# Patient Record
Sex: FEMALE | Race: WHITE | NOT HISPANIC OR LATINO | Employment: UNEMPLOYED | URBAN - METROPOLITAN AREA
[De-identification: names, ages, dates, MRNs, and addresses within clinical notes are randomized per-mention and may not be internally consistent; named-entity substitution may affect disease eponyms.]

---

## 2019-10-17 ENCOUNTER — APPOINTMENT (OUTPATIENT)
Dept: GENERAL RADIOLOGY | Facility: HOSPITAL | Age: 44
End: 2019-10-17

## 2019-10-17 ENCOUNTER — HOSPITAL ENCOUNTER (EMERGENCY)
Facility: HOSPITAL | Age: 44
Discharge: HOME | End: 2019-10-17
Attending: EMERGENCY MEDICINE | Admitting: EMERGENCY MEDICINE

## 2019-10-17 VITALS
WEIGHT: 185 LBS | HEIGHT: 69 IN | HEART RATE: 76 BPM | SYSTOLIC BLOOD PRESSURE: 136 MMHG | RESPIRATION RATE: 18 BRPM | TEMPERATURE: 98.2 F | DIASTOLIC BLOOD PRESSURE: 71 MMHG | BODY MASS INDEX: 27.4 KG/M2 | OXYGEN SATURATION: 98 %

## 2019-10-17 DIAGNOSIS — S43.402A SPRAIN OF LEFT SHOULDER, UNSPECIFIED SHOULDER SPRAIN TYPE, INITIAL ENCOUNTER: ICD-10-CM

## 2019-10-17 DIAGNOSIS — S73.102A HIP SPRAIN, LEFT, INITIAL ENCOUNTER: ICD-10-CM

## 2019-10-17 DIAGNOSIS — S53.402A ELBOW SPRAIN, LEFT, INITIAL ENCOUNTER: ICD-10-CM

## 2019-10-17 DIAGNOSIS — S39.012A BACK STRAIN, INITIAL ENCOUNTER: ICD-10-CM

## 2019-10-17 DIAGNOSIS — S63.502A SPRAIN OF LEFT WRIST, INITIAL ENCOUNTER: ICD-10-CM
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
## 2019-10-17 LAB
B-HCG UR QL: NEGATIVE
INTERNAL NEGATIVE CONTROL: NEGATIVE
INTERNAL POSITIVE CONTROL: POSITIVE
Lab: NORMAL

## 2019-10-17 PROCEDURE — 71101 X-RAY EXAM UNILAT RIBS/CHEST: CPT

## 2019-10-17 PROCEDURE — 72040 X-RAY EXAM NECK SPINE 2-3 VW: CPT

## 2019-10-17 PROCEDURE — 81025 URINE PREGNANCY TEST: CPT | Performed by: PHYSICIAN ASSISTANT

## 2019-10-17 PROCEDURE — 73030 X-RAY EXAM OF SHOULDER: CPT

## 2019-10-17 PROCEDURE — 73070 X-RAY EXAM OF ELBOW: CPT

## 2019-10-17 PROCEDURE — 72100 X-RAY EXAM L-S SPINE 2/3 VWS: CPT

## 2019-10-17 PROCEDURE — 73110 X-RAY EXAM OF WRIST: CPT

## 2019-10-17 PROCEDURE — 73502 X-RAY EXAM HIP UNI 2-3 VIEWS: CPT

## 2019-10-17 PROCEDURE — 99283 EMERGENCY DEPT VISIT LOW MDM: CPT

## 2019-10-17 RX ORDER — CYCLOBENZAPRINE HCL 10 MG
10 TABLET ORAL 3 TIMES DAILY PRN
Qty: 15 TABLET | Refills: 0 | Status: SHIPPED | OUTPATIENT
Start: 2019-10-17

## 2019-10-17 RX ORDER — IBUPROFEN 400 MG/1
800 TABLET ORAL ONCE
Status: COMPLETED | OUTPATIENT
Start: 2019-10-17 | End: 2019-10-17

## 2019-10-17 RX ADMIN — IBUPROFEN 800 MG: 400 TABLET, FILM COATED ORAL at 11:45

## 2019-10-17 NOTE — ED PROVIDER NOTES
Subjective   Georgie David is a 43 y.o. female who presents to the ED with c/o fall. The patient reports ambulating in her home 5 days ago when she fell and states she hit so hard so she bounced up and hit the hardwood floor again. She did not present to an ED after the fall as she thought the pain would subside but over the past 5 days the shooting pain has worsened, specifically in her left rib, left hip, and left leg, prompting her to present to the ED. The patient complains of mild left sided hand and foot pain, pain upon deep breathing, and ambulating with a limp due to her pain. She denies any previous injuries to her shoulders or ribs. The patient is not currently taking anticoagulants but reports taking insulin for diabetes mellitus. She denies a possible pregnancy. There are no other acute complaints at this time.        History provided by:  Patient  Fall   Mechanism of injury: fall    Injury location:  Pelvis and torso  Torso injury location:  Back (left hip)  Pelvic injury location:  L hip  Incident location:  Home  Time since incident:  5 days  Arrived directly from scene: no    Fall:     Fall occurred:  Standing    Impact surface:  Hard floor    Point of impact:  Buttocks and back    Entrapped after fall: no    Protective equipment: none    Prior to arrival data:     Bystander interventions:  None    Patient ambulatory at scene: yes      Blood loss:  None    Responsiveness at scene:  Alert    Orientation at scene:  Person, place, situation and time    Loss of consciousness: no      Amnesic to event: no      Airway condition since incident:  Stable    Breathing condition since incident:  Stable    Circulation condition since incident:  Stable    Mental status condition since incident:  Stable  Associated symptoms: no abdominal pain, no back pain, no chest pain, no loss of consciousness, no nausea, no neck pain and no vomiting    Risk factors: diabetes    Risk factors: no anticoagulation therapy         Review of Systems   Cardiovascular: Negative for chest pain.   Gastrointestinal: Negative for abdominal pain, nausea and vomiting.   Musculoskeletal: Positive for gait problem (patient walks with a limp due to the pain). Negative for back pain and neck pain.        Patient complains of left rib, left hip, and left leg pain.  There is mild pain in her left foot and hand.  The pain is shooting and exacerbated by deep breathing.   Neurological: Negative for loss of consciousness.   All other systems reviewed and are negative.      No past medical history on file.    No Known Allergies    No past surgical history on file.    No family history on file.    Social History     Socioeconomic History   • Marital status:      Spouse name: Not on file   • Number of children: Not on file   • Years of education: Not on file   • Highest education level: Not on file         Objective   Physical Exam   Constitutional: She is oriented to person, place, and time. Vital signs are normal. She appears well-developed.   HENT:   Head: Atraumatic.   Nose: Nose normal.   Mouth/Throat: Mucous membranes are normal.   Eyes: Conjunctivae, EOM and lids are normal. Pupils are equal, round, and reactive to light.   Neck: Normal range of motion. Neck supple.   Cardiovascular: Normal rate, regular rhythm and normal heart sounds.   Pulmonary/Chest: Effort normal and breath sounds normal. She has no wheezes. She exhibits tenderness (left inferior chest wall ). She exhibits no crepitus, no deformity and no swelling.   Abdominal: Soft. She exhibits no distension. There is no tenderness. There is no rebound and no guarding.   Musculoskeletal: She exhibits no edema.        Left shoulder: She exhibits tenderness. She exhibits normal range of motion, no swelling, no deformity, normal pulse and normal strength.        Left elbow: She exhibits normal range of motion, no swelling and no deformity. Tenderness found.        Left wrist: She exhibits  tenderness. She exhibits normal range of motion, no swelling and no deformity.        Left hip: She exhibits tenderness. She exhibits normal range of motion, no swelling and no deformity.        Cervical back: She exhibits tenderness. She exhibits normal range of motion, no swelling and no deformity.        Thoracic back: She exhibits normal range of motion and no tenderness.        Lumbar back: She exhibits tenderness. She exhibits normal range of motion, no swelling and no deformity.   Neurological: She is alert and oriented to person, place, and time. No sensory deficit.   Skin: Skin is warm and dry. No rash noted. No erythema.   Psychiatric: She has a normal mood and affect. Her speech is normal and behavior is normal.   Nursing note and vitals reviewed.      Procedures         ED Course      Discussed results with patient and tx plan for discharge. No acute fractures on xrays. List of PCP's provided for her to establish care.     Recent Results (from the past 24 hour(s))   POCT Pregnancy, Urine    Collection Time: 10/17/19 11:49 AM   Result Value Ref Range    HCG, Urine, QL Negative Negative    Lot Number [id number]     Internal Positive Control Positive     Internal Negative Control Negative      Note: In addition to lab results from this visit, the labs listed above may include labs taken at another facility or during a different encounter within the last 24 hours. Please correlate lab times with ED admission and discharge times for further clarification of the services performed during this visit.    XR Wrist 3+ View Left   Preliminary Result   No acute bony abnormality.       D:  10/17/2019   E:  10/17/2019                  XR Ribs Left With PA Chest   Preliminary Result   No acute cardiopulmonary disease, no left-sided rib fracture   present.       D:  10/17/2019   E:  10/17/2019                  XR Spine Cervical 2 View   Preliminary Result   No acute fracture or dislocation with slight straightening   of  "the normal lordosis of the cervical spine.       D:  10/17/2019   E:  10/17/2019              XR Shoulder 2+ View Left   Preliminary Result   No acute bony abnormality.       D:  10/17/2019   E:  10/17/2019                  XR Spine Lumbar 2 or 3 View   Preliminary Result   No acute fracture or malalignment.           D:  10/17/2019   E:  10/17/2019          XR Elbow 2 View Left   Preliminary Result   No acute bony abnormality.       D:  10/17/2019   E:  10/17/2019              XR Hip With or Without Pelvis 2 - 3 View Left   Preliminary Result   Degenerative changes seen within the left sacroiliac joint   and left pubic symphysis with no fracture or dislocation seen within the   left hip.       D:  10/17/2019   E:  10/17/2019            Vitals:    10/17/19 1047 10/17/19 1358   BP: 139/73 136/71   BP Location: Left arm Left arm   Patient Position: Sitting Sitting   Pulse: 72 76   Resp: 16 18   Temp: 97.3 °F (36.3 °C) 98.2 °F (36.8 °C)   TempSrc: Oral Oral   SpO2: 98% 98%   Weight: 83.9 kg (185 lb)    Height: 175.3 cm (69\")      Medications   ibuprofen (ADVIL,MOTRIN) tablet 800 mg (800 mg Oral Given 10/17/19 1145)     ECG/EMG Results (last 24 hours)     ** No results found for the last 24 hours. **        No orders to display                       MDM    Final diagnoses:   Fall, initial encounter   Back strain, initial encounter   Sprain of left shoulder, unspecified shoulder sprain type, initial encounter   Sprain of left wrist, initial encounter   Elbow sprain, left, initial encounter   Hip sprain, left, initial encounter       Documentation assistance provided by lizbeth OSWALD Cory.  Information recorded by the scribngoc was done at my direction and has been verified and validated by me.     James Ray  10/17/19 9265       Luanne Payton PA  10/17/19 6365    "

## 2025-01-30 ENCOUNTER — OFFICE VISIT (OUTPATIENT)
Age: 50
End: 2025-01-30
Payer: MEDICAID

## 2025-01-30 VITALS
HEIGHT: 69 IN | WEIGHT: 137 LBS | SYSTOLIC BLOOD PRESSURE: 105 MMHG | DIASTOLIC BLOOD PRESSURE: 70 MMHG | BODY MASS INDEX: 20.29 KG/M2

## 2025-01-30 DIAGNOSIS — S93.602A FOOT SPRAIN, LEFT, INITIAL ENCOUNTER: ICD-10-CM
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
## 2025-01-30 DIAGNOSIS — M79.672 LEFT FOOT PAIN: Primary | ICD-10-CM

## 2025-01-30 RX ORDER — METHOCARBAMOL 750 MG/1
TABLET, FILM COATED ORAL DAILY
COMMUNITY
Start: 2015-02-09

## 2025-01-30 RX ORDER — CLONAZEPAM 1 MG/1
1 TABLET ORAL 3 TIMES DAILY PRN
COMMUNITY

## 2025-01-30 RX ORDER — ATORVASTATIN CALCIUM 10 MG/1
40 TABLET, FILM COATED ORAL DAILY
COMMUNITY

## 2025-01-30 RX ORDER — FERROUS SULFATE 325(65) MG
TABLET ORAL DAILY
COMMUNITY
Start: 2015-02-09

## 2025-01-30 RX ORDER — INSULIN GLARGINE 100 [IU]/ML
INJECTION, SOLUTION SUBCUTANEOUS DAILY
COMMUNITY

## 2025-01-30 RX ORDER — GABAPENTIN 800 MG/1
800 TABLET ORAL 3 TIMES DAILY
COMMUNITY

## 2025-01-30 NOTE — PROGRESS NOTES
[facility] [place] [facility] Office Visit     Office Visit       Date: 01/30/2025   Patient Name: Georgie David  MRN: [id number]  YOB: 1975    Referring Physician: Nadira Rios*     Chief Complaint:   Chief Complaint   Patient presents with    Left Foot - Pain     History of Present Illness:   Georgie David is a 49 y.o. female who presents with new problem of: left foot pain.  Onset: mechanical fall 12/26/24. The issue has been ongoing for 1 month(s). Pain is a 8/10 on the pain scale. Pain is described as dull, aching, burning, throbbing, stabbing, and shooting. Associated symptoms include pain and swelling. The pain is worse with walking, standing, sitting, climbing stairs, sleeping, and certain movements ; heat improve the pain. Previous treatments have included: nothing.    Subjective   Review of Systems: Review of Systems   Constitutional:  Negative for chills, fever, unexpected weight gain and unexpected weight loss.   HENT:  Negative for congestion, postnasal drip and rhinorrhea.    Eyes:  Negative for blurred vision.   Respiratory:  Negative for shortness of breath.    Cardiovascular:  Negative for leg swelling.   Gastrointestinal:  Negative for abdominal pain, nausea and vomiting.   Genitourinary:  Negative for difficulty urinating.   Musculoskeletal:  Positive for arthralgias and joint swelling. Negative for gait problem and myalgias.   Skin:  Negative for skin lesions and wound.   Neurological:  Negative for dizziness, weakness, light-headedness and numbness.   Hematological:  Does not bruise/bleed easily.   Psychiatric/Behavioral:  Negative for depressed mood.    All other systems reviewed and are negative.       I have reviewed the following portions of the patient's history:History of Present Illness and review of systems.    Past Medical History:   Past Medical History:   Diagnosis Date    Anemia     Diabetes     Fibromyalgia         Past Surgical History:   Past Surgical History:   Procedure Laterality Date     SECTION      x2    DENTAL PROCEDURE      all teeth removal       Family History: History reviewed. No pertinent family history.    Social History:   Social History     Socioeconomic History    Marital status:    Tobacco Use    Smoking status: Never    Smokeless tobacco: Never   Vaping Use    Vaping status: Never Used   Substance and Sexual Activity    Alcohol use: Yes     Comment: once a year if that    Drug use: Yes     Types: Marijuana     Comment: for pain    Sexual activity: Defer       Medications:   Current Outpatient Medications:     atorvastatin (LIPITOR) 10 MG tablet, 4 tablets Daily., Disp: , Rfl:     clonazePAM (KlonoPIN) 1 MG tablet, Take 1 tablet by mouth 3 (Three) Times a Day As Needed., Disp: , Rfl:     Cyanocobalamin (B-12 PO), Take  by mouth., Disp: , Rfl:     cyclobenzaprine (FLEXERIL) 10 MG tablet, Take 1 tablet by mouth 3 (Three) Times a Day As Needed for Muscle Spasms for up to 15 doses., Disp: 15 tablet, Rfl: 0    Dulaglutide (TRULICITY SC), Inject  under the skin into the appropriate area as directed Daily., Disp: , Rfl:     Ergocalciferol (VITAMIN D2 PO), Take 2 tablets by mouth Daily., Disp: , Rfl:     ferrous sulfate 325 (65 FE) MG tablet, Take  by mouth Daily., Disp: , Rfl:     gabapentin (NEURONTIN) 800 MG tablet, Take 1 tablet by mouth 3 (Three) Times a Day., Disp: , Rfl:     Galcanezumab-gnlm (EMGALITY SC), Inject  under the skin into the appropriate area as directed Daily., Disp: , Rfl:     insulin glargine (LANTUS, SEMGLEE) 100 UNIT/ML injection, Inject  under the skin into the appropriate area as directed Daily., Disp: , Rfl:     Lumateperone Tosylate (CAPLYTA PO), Take  by mouth Every Night., Disp: , Rfl:     methocarbamol (ROBAXIN) 750 MG tablet, Take  by mouth Daily., Disp: , Rfl:     omeprazole (priLOSEC) 20 MG capsule, Take 1 capsule by mouth Daily., Disp: , Rfl:     Thiamine  "Mononitrate (B1 PO), Take  by mouth Daily., Disp: , Rfl:     Allergies: No Known Allergies    I reviewed the patient's chief complaint, history of present illness, review of systems, past medical history, surgical history, family history, social history, medications and allergy list.     Objective    Vital Signs:   Vitals:    01/30/25 1453   BP: 105/70   Weight: 62.1 kg (137 lb)   Height: 174 cm (68.5\")     Body mass index is 20.53 kg/m².   BMI is within normal parameters. No other follow-up for BMI required.     Patient reports that she is a non-smoker and has not ever been a smoker.  This behavior was applauded and she was encouraged to continue in smoking cessation.  We will continue to monitor at subsequent visits.    Ortho Exam:  Gait and Station: Appearance: limp   Skin: Right Lower Extremity: normal. Left Lower Extremity: normal.   Left foot exam:   Normal foot contour without significant swelling or ecchymosis at proximal 1st and 2nd MT.   Negative abrasion or ulceration.   Sensation grossly intact to all digits upon her foot and dorsum of the foot.   Mobility in all toes present.   Refill less than 2 seconds.   Dorsalis pedis pulse intact.   Positive tenderness at 1st and 2nd proximal metatarsal and navicular   dorsiflexion of the ankle 5° with the knee flexed and a 0° with the knee extended.   Negative tenderness at the Achilles tendon.   full hip and knee motion  Right foot exam:   Normal ankle and foot contour without evidence of swelling or ecchymosis.   Negative tenderness throughout including the plantar aspect of the foot.   Sensation grossly intact.     Results Review:   Imaging Results (Last 24 Hours)       Procedure Component Value Units Date/Time    XR Foot 3+ View Left [[id number]] Resulted: 01/30/25 1521     Updated: 01/30/25 1521    Narrative:      Indication: Left foot pain.      Views: AP lateral and oblique views of the feet are submitted.     Impression: There is no widening. There is no " fracture subluxation or   dislocation. There are no acute changes.    Comparison: None.               Procedures    Assessment / Plan    Assessment/Plan:   Diagnoses and all orders for this visit:    1. Left foot pain (Primary)  -     XR Foot 3+ View Left    2. Foot sprain, left, initial encounter  -     CT foot left wo contrast; Future    3. Fall from slip, trip, or stumble, initial encounter    Fall just over 1 month ago at home.  She is unclear exactly how she fell.  She was unable to bear weight initially.  Has been to see her primary care and was initially placed into a walking boot.  She states this made her pain burn more.  Radiographs of her left foot today do not show any clear osseous abnormality.  She is most tender however over the dorsum of the midfoot near the first second and third proximal metatarsals.  Given the length of time since her injury as well as her difficulty bearing weight, I do recommend that she utilize the walking boot to bear weight.  I would also obtain CT scan of the left foot to evaluate for left midfoot sprain and to ensure that her Lisfranc ligaments are intact and that her foot is stable.  I will see her back after the CT to discuss results and next steps.  Can take Tylenol and over-the-counter anti-inflammatories as needed for pain control.  Should apply ice.    Follow Up:   No follow-ups on file.      Kosta Hull MD  [facility] [place] [facility]

## 2025-02-13 ENCOUNTER — TELEPHONE (OUTPATIENT)
Dept: ORTHOPEDIC SURGERY | Facility: CLINIC | Age: 50
End: 2025-02-13
Payer: MEDICAID

## 2025-02-13 NOTE — TELEPHONE ENCOUNTER
HUB OK TO RELAY    MAILED NO SHOW LETTER FOR MISSED CT APPT ON 2/12/25.     IF PATIENT CALLS BACK ABOUT RESCHEDULING CT SCAN, PLEASE GIVE HER THE NUMBER TO CENTRAL SCHEDULING, [phone] TO RESCHEDULE THE SCAN AT HER CONVENIENCE.    ONCE CT IS RESCHEDULED, [facility] OK TO SCHEDULE F/U APPT FOR AT LEAST THREE DAYS AFTER.